# Patient Record
Sex: FEMALE | Race: OTHER | URBAN - METROPOLITAN AREA
[De-identification: names, ages, dates, MRNs, and addresses within clinical notes are randomized per-mention and may not be internally consistent; named-entity substitution may affect disease eponyms.]

---

## 2017-09-28 ENCOUNTER — INPATIENT (INPATIENT)
Facility: HOSPITAL | Age: 67
LOS: 0 days | Discharge: HOME | DRG: 203 | End: 2017-09-29
Attending: STUDENT IN AN ORGANIZED HEALTH CARE EDUCATION/TRAINING PROGRAM | Admitting: STUDENT IN AN ORGANIZED HEALTH CARE EDUCATION/TRAINING PROGRAM
Payer: MEDICARE

## 2017-09-28 VITALS
SYSTOLIC BLOOD PRESSURE: 131 MMHG | TEMPERATURE: 99 F | WEIGHT: 138.01 LBS | DIASTOLIC BLOOD PRESSURE: 84 MMHG | HEART RATE: 85 BPM | OXYGEN SATURATION: 90 % | RESPIRATION RATE: 26 BRPM

## 2017-09-28 DIAGNOSIS — Z98.890 OTHER SPECIFIED POSTPROCEDURAL STATES: Chronic | ICD-10-CM

## 2017-09-28 LAB
ALBUMIN SERPL ELPH-MCNC: 3.9 G/DL — SIGNIFICANT CHANGE UP (ref 3.4–5)
ALP SERPL-CCNC: 109 U/L — SIGNIFICANT CHANGE UP (ref 40–120)
ALT FLD-CCNC: 26 U/L — SIGNIFICANT CHANGE UP (ref 12–42)
ANION GAP SERPL CALC-SCNC: 10 MMOL/L — SIGNIFICANT CHANGE UP (ref 9–16)
AST SERPL-CCNC: 22 U/L — SIGNIFICANT CHANGE UP (ref 15–37)
BILIRUB SERPL-MCNC: 1 MG/DL — SIGNIFICANT CHANGE UP (ref 0.2–1.2)
BUN SERPL-MCNC: 18 MG/DL — SIGNIFICANT CHANGE UP (ref 7–23)
CALCIUM SERPL-MCNC: 9 MG/DL — SIGNIFICANT CHANGE UP (ref 8.5–10.5)
CHLORIDE SERPL-SCNC: 102 MMOL/L — SIGNIFICANT CHANGE UP (ref 96–108)
CK MB BLD-MCNC: 0.78 % — SIGNIFICANT CHANGE UP
CK MB CFR SERPL CALC: 2.3 NG/ML — SIGNIFICANT CHANGE UP (ref 0.5–3.6)
CO2 SERPL-SCNC: 24 MMOL/L — SIGNIFICANT CHANGE UP (ref 22–31)
CREAT SERPL-MCNC: 0.94 MG/DL — SIGNIFICANT CHANGE UP (ref 0.5–1.3)
D DIMER BLD IA.RAPID-MCNC: <150 NG/ML DDU — SIGNIFICANT CHANGE UP
GLUCOSE SERPL-MCNC: 145 MG/DL — HIGH (ref 70–99)
HCT VFR BLD CALC: 40.6 % — SIGNIFICANT CHANGE UP (ref 34.5–45)
HGB BLD-MCNC: 13.6 G/DL — SIGNIFICANT CHANGE UP (ref 11.5–15.5)
LACTATE SERPL-SCNC: 1.5 MMOL/L — SIGNIFICANT CHANGE UP (ref 0.4–2)
MCHC RBC-ENTMCNC: 27 PG — SIGNIFICANT CHANGE UP (ref 27–34)
MCHC RBC-ENTMCNC: 33.5 G/DL — SIGNIFICANT CHANGE UP (ref 32–36)
MCV RBC AUTO: 80.7 FL — SIGNIFICANT CHANGE UP (ref 80–100)
NT-PROBNP SERPL-SCNC: 185 PG/ML — SIGNIFICANT CHANGE UP
PCO2 BLDA: 36 MMHG — SIGNIFICANT CHANGE UP (ref 32–45)
PH BLDA: 7.42 — SIGNIFICANT CHANGE UP (ref 7.35–7.45)
PLATELET # BLD AUTO: 257 K/UL — SIGNIFICANT CHANGE UP (ref 150–400)
PO2 BLDA: 60 MMHG — LOW (ref 83–108)
POTASSIUM SERPL-MCNC: 3.4 MMOL/L — LOW (ref 3.5–5.3)
POTASSIUM SERPL-SCNC: 3.4 MMOL/L — LOW (ref 3.5–5.3)
PROT SERPL-MCNC: 8.7 G/DL — HIGH (ref 6.4–8.2)
RBC # BLD: 5.03 M/UL — SIGNIFICANT CHANGE UP (ref 3.8–5.2)
RBC # FLD: 14.1 % — SIGNIFICANT CHANGE UP (ref 10.3–16.9)
SAO2 % BLDA: 92 % — LOW (ref 95–100)
SODIUM SERPL-SCNC: 136 MMOL/L — SIGNIFICANT CHANGE UP (ref 132–145)
TROPONIN I SERPL-MCNC: 0.02 NG/ML — SIGNIFICANT CHANGE UP (ref 0.02–0.06)
TROPONIN I SERPL-MCNC: 0.03 NG/ML — SIGNIFICANT CHANGE UP (ref 0.02–0.06)
WBC # BLD: 9.2 K/UL — SIGNIFICANT CHANGE UP (ref 3.8–10.5)
WBC # FLD AUTO: 9.2 K/UL — SIGNIFICANT CHANGE UP (ref 3.8–10.5)

## 2017-09-28 PROCEDURE — 93010 ELECTROCARDIOGRAM REPORT: CPT

## 2017-09-28 PROCEDURE — 71275 CT ANGIOGRAPHY CHEST: CPT | Mod: 26

## 2017-09-28 PROCEDURE — 99285 EMERGENCY DEPT VISIT HI MDM: CPT | Mod: 25

## 2017-09-28 PROCEDURE — 71010: CPT | Mod: 26

## 2017-09-28 RX ORDER — IPRATROPIUM/ALBUTEROL SULFATE 18-103MCG
3 AEROSOL WITH ADAPTER (GRAM) INHALATION ONCE
Qty: 0 | Refills: 0 | Status: COMPLETED | OUTPATIENT
Start: 2017-09-28 | End: 2017-09-28

## 2017-09-28 RX ORDER — DIPHENHYDRAMINE HCL 50 MG
50 CAPSULE ORAL ONCE
Qty: 0 | Refills: 0 | Status: COMPLETED | OUTPATIENT
Start: 2017-09-28 | End: 2017-09-28

## 2017-09-28 RX ORDER — ALBUTEROL 90 UG/1
2.5 AEROSOL, METERED ORAL ONCE
Qty: 0 | Refills: 0 | Status: COMPLETED | OUTPATIENT
Start: 2017-09-28 | End: 2017-09-28

## 2017-09-28 RX ORDER — FUROSEMIDE 40 MG
40 TABLET ORAL ONCE
Qty: 0 | Refills: 0 | Status: COMPLETED | OUTPATIENT
Start: 2017-09-28 | End: 2017-09-28

## 2017-09-28 RX ORDER — HEPARIN SODIUM 5000 [USP'U]/ML
5000 INJECTION INTRAVENOUS; SUBCUTANEOUS EVERY 8 HOURS
Qty: 0 | Refills: 0 | Status: DISCONTINUED | OUTPATIENT
Start: 2017-09-28 | End: 2017-09-29

## 2017-09-28 RX ORDER — POTASSIUM CHLORIDE 20 MEQ
40 PACKET (EA) ORAL ONCE
Qty: 0 | Refills: 0 | Status: COMPLETED | OUTPATIENT
Start: 2017-09-28 | End: 2017-09-29

## 2017-09-28 RX ORDER — ASPIRIN/CALCIUM CARB/MAGNESIUM 324 MG
325 TABLET ORAL ONCE
Qty: 0 | Refills: 0 | Status: COMPLETED | OUTPATIENT
Start: 2017-09-28 | End: 2017-09-28

## 2017-09-28 RX ORDER — IPRATROPIUM/ALBUTEROL SULFATE 18-103MCG
3 AEROSOL WITH ADAPTER (GRAM) INHALATION EVERY 4 HOURS
Qty: 0 | Refills: 0 | Status: DISCONTINUED | OUTPATIENT
Start: 2017-09-28 | End: 2017-09-29

## 2017-09-28 RX ORDER — MAGNESIUM SULFATE 500 MG/ML
2 VIAL (ML) INJECTION ONCE
Qty: 0 | Refills: 0 | Status: COMPLETED | OUTPATIENT
Start: 2017-09-28 | End: 2017-09-28

## 2017-09-28 RX ADMIN — Medication 50 MILLIGRAM(S): at 18:24

## 2017-09-28 RX ADMIN — Medication 325 MILLIGRAM(S): at 18:03

## 2017-09-28 RX ADMIN — Medication 50 GRAM(S): at 20:01

## 2017-09-28 RX ADMIN — Medication 40 MILLIGRAM(S): at 18:03

## 2017-09-28 RX ADMIN — ALBUTEROL 2.5 MILLIGRAM(S): 90 AEROSOL, METERED ORAL at 20:01

## 2017-09-28 RX ADMIN — Medication 125 MILLIGRAM(S): at 16:46

## 2017-09-28 RX ADMIN — Medication 3 MILLILITER(S): at 16:46

## 2017-09-28 RX ADMIN — ALBUTEROL 2.5 MILLIGRAM(S): 90 AEROSOL, METERED ORAL at 21:20

## 2017-09-28 RX ADMIN — Medication 3 MILLILITER(S): at 18:03

## 2017-09-28 RX ADMIN — Medication 3 MILLILITER(S): at 16:39

## 2017-09-28 NOTE — ED PROVIDER NOTE - DIAGNOSTIC INTERPRETATION
Xray (wet reads) interpreted by SADI PARRISH   CXR - Cardiac silhouette, mediastinal and hilar contours wnl, no acute consolidation, infiltrate, effusion, or PTX. No bony abnormalities noted

## 2017-09-28 NOTE — CONSULT NOTE ADULT - SUBJECTIVE AND OBJECTIVE BOX
ICU CONSULT        PMHx -   PSx -   Meds -   Allergies -   FHx -   Sx -       PHYSICAL EXAM   Vital Signs Last 24 Hrs  T(C): 36.7 (28 Sep 2017 23:13), Max: 37.1 (28 Sep 2017 16:15)  T(F): 98 (28 Sep 2017 23:13), Max: 98.7 (28 Sep 2017 16:15)  HR: 82 (28 Sep 2017 23:13) (82 - 93)  BP: 131/71 (28 Sep 2017 23:13) (106/57 - 131/84)  BP(mean): --  RR: 20 (28 Sep 2017 23:13) (20 - 80)  SpO2: 99% (28 Sep 2017 23:13) (90% - 99%)      General -   HEENT -   CV -   Resp -   Abdomen -   Extremities -   Skin -       LABS                        13.6   9.2   )-----------( 257      ( 28 Sep 2017 16:59 )             40.6     09-28    136  |  102  |  18  ----------------------------<  145<H>  3.4<L>   |  24  |  0.94    Ca    9.0      28 Sep 2017 16:59    TPro  8.7<H>  /  Alb  3.9  /  TBili  1.0  /  DBili  x   /  AST  22  /  ALT  26  /  AlkPhos  109  09-28      Lactate, Blood: 1.5 mmoL/L (09-28-17 @ 16:59)            IMAGING   CXR -   EKG - ICU CONSULT    Ms. Nehemiah Lazo is a 66 y/o F nonsmoker who presented to [facility] ED with subacute onset of SOB, preceded by one day of rhinorrhea and productive cough. Upon arrival to the ED, she was saturating 90% on room air. She underwent a CTA of the chest to r/o PE despite a D-dimer level <150. The CT did show b/l lower lobe bronchial wall thickening with mucoid impacted left lower lobe airways with associated atelectasis. She was given nebulizer treatments, solumedrol, Lasix. She was placed on 2L NC and was saturating 99%.      PMHx -   PSx -   Meds -   Allergies -   FHx -   Sx -       PHYSICAL EXAM   Vital Signs Last 24 Hrs  T(C): 36.7 (28 Sep 2017 23:13), Max: 37.1 (28 Sep 2017 16:15)  T(F): 98 (28 Sep 2017 23:13), Max: 98.7 (28 Sep 2017 16:15)  HR: 82 (28 Sep 2017 23:13) (82 - 93)  BP: 131/71 (28 Sep 2017 23:13) (106/57 - 131/84)  BP(mean): --  RR: 20 (28 Sep 2017 23:13) (20 - 80)  SpO2: 99% (28 Sep 2017 23:13) (90% - 99%)      General -   HEENT -   CV -   Resp -   Abdomen -   Extremities -   Skin -       LABS                        13.6   9.2   )-----------( 257      ( 28 Sep 2017 16:59 )             40.6     09-28    136  |  102  |  18  ----------------------------<  145<H>  3.4<L>   |  24  |  0.94    Ca    9.0      28 Sep 2017 16:59    TPro  8.7<H>  /  Alb  3.9  /  TBili  1.0  /  DBili  x   /  AST  22  /  ALT  26  /  AlkPhos  109  09-28      Lactate, Blood: 1.5 mmoL/L (09-28-17 @ 16:59)            IMAGING   CXR -   EKG - ICU CONSULT    Ms. Nehemiah Lazo is a 66 y/o F nonsmoker who presented to [facility] ED with subacute onset of SOB, preceded by one day of rhinorrhea and productive cough. Upon arrival to the ED, she was saturating 90% on room air. She underwent a CTA of the chest to r/o PE despite a D-dimer level <150. The CT did show b/l lower lobe bronchial wall thickening with mucoid impacted left lower lobe airways with associated atelectasis. She was given nebulizer treatments, solumedrol, Lasix. She was placed on 2L NC and was saturating 99%.      PMHx -  HLD, breast cancer  PSx - lumpectomy (2014)  Meds - anastrazole, atorvastatin, vitamin D3  Allergies - none known  FHx - noncontributory  Sx - denies smoking history      PHYSICAL EXAM   Vital Signs Last 24 Hrs  T(C): 36.7 (28 Sep 2017 23:13), Max: 37.1 (28 Sep 2017 16:15)  T(F): 98 (28 Sep 2017 23:13), Max: 98.7 (28 Sep 2017 16:15)  HR: 82 (28 Sep 2017 23:13) (82 - 93)  BP: 131/71 (28 Sep 2017 23:13) (106/57 - 131/84)  BP(mean): --  RR: 20 (28 Sep 2017 23:13) (20 - 80)  SpO2: 99% (28 Sep 2017 23:13) (90% - 99%)      General -   HEENT -   CV -   Resp -   Abdomen -   Extremities -   Skin -       LABS                        13.6   9.2   )-----------( 257      ( 28 Sep 2017 16:59 )             40.6     09-28    136  |  102  |  18  ----------------------------<  145<H>  3.4<L>   |  24  |  0.94    Ca    9.0      28 Sep 2017 16:59    TPro  8.7<H>  /  Alb  3.9  /  TBili  1.0  /  DBili  x   /  AST  22  /  ALT  26  /  AlkPhos  109  09-28      Lactate, Blood: 1.5 mmoL/L (09-28-17 @ 16:59)            IMAGING   CXR -   EKG - ICU CONSULT    Ms. Nehemiah Lazo is a 66 y/o F nonsmoker who presented to [facility] ED with subacute onset of SOB, preceded by one day of rhinorrhea and productive cough. Upon arrival to the ED, she was saturating 90% on room air. She underwent a CTA of the chest to r/o PE despite a D-dimer level <150. The CT did show b/l lower lobe bronchial wall thickening with mucoid impacted left lower lobe airways with associated atelectasis. She was given nebulizer treatments, solumedrol, Lasix. She was placed on 2L NC and was saturating 99%.      PMHx -  HLD, breast cancer  PSx - lumpectomy (2014)  Meds - anastrazole, atorvastatin, vitamin D3  Allergies - none known  FHx - noncontributory  Sx - denies smoking history      PHYSICAL EXAM   Vital Signs Last 24 Hrs  T(C): 36.7 (28 Sep 2017 23:13), Max: 37.1 (28 Sep 2017 16:15)  T(F): 98 (28 Sep 2017 23:13), Max: 98.7 (28 Sep 2017 16:15)  HR: 82 (28 Sep 2017 23:13) (82 - 93)  BP: 131/71 (28 Sep 2017 23:13) (106/57 - 131/84)  BP(mean): --  RR: 20 (28 Sep 2017 23:13) (20 - 80)  SpO2: 99% (28 Sep 2017 23:13) (90% - 99%)      General - NAD, comfortable on 2L NC  HEENT - NC/AT, PERRLA, MMM  CV - S1/S2, RRR, no murmur  Resp - fine inspiratory/expiratory crackles diffusely, no wheezing or stridor   Abdomen - +BS, soft, NTND, no masses  Extremities - WWP, no edema  Skin - no rash      LABS                        13.6   9.2   )-----------( 257      ( 28 Sep 2017 16:59 )             40.6     09-28    136  |  102  |  18  ----------------------------<  145<H>  3.4<L>   |  24  |  0.94    Ca    9.0      28 Sep 2017 16:59    TPro  8.7<H>  /  Alb  3.9  /  TBili  1.0  /  DBili  x   /  AST  22  /  ALT  26  /  AlkPhos  109  09-28      Lactate, Blood: 1.5 mmoL/L (09-28-17 @ 16:59)            IMAGING   CXR -   EKG - ICU CONSULT    Ms. Nehemiah Lazo is a 68 y/o F nonsmoker who presented to [facility] ED with subacute onset of SOB, preceded by one day of rhinorrhea and cough productive of yellow sputum. Upon arrival to the ED, she was saturating 90% on room air. She underwent a CTA of the chest to r/o PE despite a D-dimer level <150. The CT did show b/l lower lobe bronchial wall thickening with mucoid impacted left lower lobe airways with associated atelectasis. She was given nebulizer treatments, solumedrol, Lasix. She was placed on 2L NC and was saturating 99%.      PMHx -  HLD, breast cancer  PSx - lumpectomy (2014)  Meds - anastrazole, atorvastatin, vitamin D3  Allergies - none known  FHx - noncontributory  Sx - denies smoking history      PHYSICAL EXAM   Vital Signs Last 24 Hrs  T(C): 36.7 (28 Sep 2017 23:13), Max: 37.1 (28 Sep 2017 16:15)  T(F): 98 (28 Sep 2017 23:13), Max: 98.7 (28 Sep 2017 16:15)  HR: 82 (28 Sep 2017 23:13) (82 - 93)  BP: 131/71 (28 Sep 2017 23:13) (106/57 - 131/84)  BP(mean): --  RR: 20 (28 Sep 2017 23:13) (20 - 80)  SpO2: 99% (28 Sep 2017 23:13) (90% - 99%)      General - NAD, comfortable on 2L NC  HEENT - NC/AT, PERRLA, MMM  CV - S1/S2, RRR, no murmur  Resp - fine inspiratory/expiratory crackles diffusely, no wheezing or stridor   Abdomen - +BS, soft, NTND, no masses  Extremities - WWP, no edema  Skin - no rash      LABS                        13.6   9.2   )-----------( 257      ( 28 Sep 2017 16:59 )             40.6     09-28    136  |  102  |  18  ----------------------------<  145<H>  3.4<L>   |  24  |  0.94    Ca    9.0      28 Sep 2017 16:59    TPro  8.7<H>  /  Alb  3.9  /  TBili  1.0  /  DBili  x   /  AST  22  /  ALT  26  /  AlkPhos  109  09-28      Lactate, Blood: 1.5 mmoL/L (09-28-17 @ 16:59)            IMAGING   CXR -   EKG -

## 2017-09-28 NOTE — ED PROVIDER NOTE - ATTENDING CONTRIBUTION TO CARE
67 y.o. female with HTN hx breast CA now in remission with c/o 2 days increasing difficulty breathing and fatigue, cough, on arrival with moderate resp distress and wheezing, some improvement with nebs and steroids, CAT negative for PE, VSS but still hypoxic on RA and requiring supplement o2 as well as continued nebs, admit to [facility] medicine for further workup of RAD/bronchitis, persistent hypoxia despite treatment in ED.

## 2017-09-28 NOTE — ED PROVIDER NOTE - CARE PLAN
Principal Discharge DX:	Acute respiratory distress  Secondary Diagnosis:	Hypoxia  Secondary Diagnosis:	Wheezing

## 2017-09-28 NOTE — CONSULT NOTE ADULT - ASSESSMENT
66 y/o F presented with cough and SOB. Found to have mild hypoxia, which improved with nasal cannula. Symptoms attributable to acute bronchitis. No indication for admission to MICU or MICU-step down at this time.

## 2017-09-28 NOTE — CONSULT NOTE ADULT - ATTENDING COMMENTS
[facility] ATTENDING    Patient seen and discussed with House Officer/Resident  Chart and history reviewed  Exam, labs, and radiology as noted above   Assessment and Plans as outlined  Acute bronchospasm and mild pvc noted on cxr responding to combination steroids diuresis and bronchodilators  Currently patient's mentation is appropriate   Protecting airway   Breathing is non-labored without increased work of breathing --- no intercostal accessory muscle use and no paradoxical abdominal motion evident   Ventilating and oxygenating adequately without increased work of breathing  Hemodynamically stable---clinically perfusing adequately  Aim to maintain MAP >= 65 mmHg, U/O >= 0.5 ml/kg/hr, pulse oximetry OxSat >= 92%   Metabolic demands along with any abnormal blood chemistries, and fluid requirements being addressed    Sedation and/or pain meds as needed to reduce metabolic demand and maintain comfort   GI/DVT prophylaxis

## 2017-09-28 NOTE — ED PROVIDER NOTE - MEDICAL DECISION MAKING DETAILS
pt with acute onset of SOB at rest today, noted 2d of productive cough, afebrile otherwise, tachypneic and hypoxic to 88% on RA on arrival, EKG with slight non specific T wave changes, pt is cp free, labs unremarkable, ABG with hypoxia, trop neg x 2, repeat EKG with no dynamic changes, CTA neg for acute PE/infectious process, s/p multiple neb tx, solumedrol, Mg, and lasix with slight improvement, still hypoxic at 94% on 2L, case discussed with Dr. Yu, stable for admission to [facility] medicine for acute resp distress/hypoxia/wheezing

## 2017-09-28 NOTE — CONSULT NOTE ADULT - PROBLEM SELECTOR RECOMMENDATION 9
Likely acute viral bronchitis as evidenced by her history and CT scan evidence of bronchial wall thickening, with associated atelectasis due to mucoid impacted airways. She likely has some component of mild radiation-induced lung disease, as evidenced by fine crackles on auscultation and CT evidence of biapical scarring. However, doubt this is contributing to her current presentation since at baseline she is asymptomatic with excellent exercise tolerance.  RVP already sent, would expect it to be positive. No indication for antibiotics. No wheezing, so no indication for albuterol. May give a short 5-day course of prednisone if desired. PRN dextromethorphan syrup for cough if needed. Expect self-resolution of symptoms.

## 2017-09-28 NOTE — ED PROVIDER NOTE - OBJECTIVE STATEMENT
66 yo F with PMHx of breast CA s/p L lumpectomy, RTX, currently in remission x 3 yrs, HTN, presenting c/o acute worsening of SOB today.  Pt reports having cough productive of yellowish sputum in the past 2d with associated GASTELUM.  Noted acutely worsening SOB at rest today.  Denies fever, chills, wheezing, hemoptysis, CP, orthopnea, palpitations, peripheral edema, stridors, focal weakness, sore throat, tinnitus, HA, dizziness, N/V/D/C, abdominal pain, change in urinary/bowel function, night sweats, and malaise. No recent travel or sick contact noted

## 2017-09-28 NOTE — ED PROVIDER NOTE - PHYSICAL EXAMINATION
Gen - WDWN F, speaking in full sentences, mild tachypneic, no accessory muscle use   Skin - warm, dry, intact  HEENT - AT/NC, PERRL, EOMI, no conjunctival injection, o/p clear with no erythema, edema, or exudate, uvula midline, airway patent, neck supple, no JVD or carotid bruits b/l, no palpable nodes   CV - S1S2, R/R/R  Resp - mildly tachypneic, b/l inspiratory and expiratory wheezing with diminished bs, no r/r   GI - NABS, soft, ND, NT, no rebound or guarding, no CVAT b/l   MS - w/w/p, no c/c/e, calves supple and NT, distal pulses symmetric b/l   Neuro - AxOx3, no focal neuro deficits, CN II-XII grossly intact, cerebellar function intact, ambulatory without gait disturbance

## 2017-09-28 NOTE — ED PROVIDER NOTE - ST/T WAVE
no acute ST-T wave changes no acute ST-T wave changes, poor baseline slight STD in V4-6, no LY or T wave changes slight non specific ST-T wave changes in V4-5

## 2017-09-29 VITALS
SYSTOLIC BLOOD PRESSURE: 127 MMHG | HEART RATE: 64 BPM | DIASTOLIC BLOOD PRESSURE: 71 MMHG | TEMPERATURE: 98 F | RESPIRATION RATE: 19 BRPM | OXYGEN SATURATION: 98 %

## 2017-09-29 DIAGNOSIS — J20.9 ACUTE BRONCHITIS, UNSPECIFIED: ICD-10-CM

## 2017-09-29 DIAGNOSIS — E78.5 HYPERLIPIDEMIA, UNSPECIFIED: ICD-10-CM

## 2017-09-29 DIAGNOSIS — R73.9 HYPERGLYCEMIA, UNSPECIFIED: ICD-10-CM

## 2017-09-29 DIAGNOSIS — J20.6 ACUTE BRONCHITIS DUE TO RHINOVIRUS: ICD-10-CM

## 2017-09-29 DIAGNOSIS — R06.02 SHORTNESS OF BREATH: ICD-10-CM

## 2017-09-29 DIAGNOSIS — R63.8 OTHER SYMPTOMS AND SIGNS CONCERNING FOOD AND FLUID INTAKE: ICD-10-CM

## 2017-09-29 DIAGNOSIS — Z29.9 ENCOUNTER FOR PROPHYLACTIC MEASURES, UNSPECIFIED: ICD-10-CM

## 2017-09-29 DIAGNOSIS — R91.1 SOLITARY PULMONARY NODULE: ICD-10-CM

## 2017-09-29 DIAGNOSIS — R09.02 HYPOXEMIA: ICD-10-CM

## 2017-09-29 DIAGNOSIS — E87.6 HYPOKALEMIA: ICD-10-CM

## 2017-09-29 DIAGNOSIS — D72.828 OTHER ELEVATED WHITE BLOOD CELL COUNT: ICD-10-CM

## 2017-09-29 DIAGNOSIS — Z85.3 PERSONAL HISTORY OF MALIGNANT NEOPLASM OF BREAST: ICD-10-CM

## 2017-09-29 LAB
ANION GAP SERPL CALC-SCNC: 19 MMOL/L — HIGH (ref 5–17)
BUN SERPL-MCNC: 22 MG/DL — SIGNIFICANT CHANGE UP (ref 7–23)
CALCIUM SERPL-MCNC: 9 MG/DL — SIGNIFICANT CHANGE UP (ref 8.4–10.5)
CHLORIDE SERPL-SCNC: 98 MMOL/L — SIGNIFICANT CHANGE UP (ref 96–108)
CO2 SERPL-SCNC: 20 MMOL/L — LOW (ref 22–31)
CREAT SERPL-MCNC: 0.85 MG/DL — SIGNIFICANT CHANGE UP (ref 0.5–1.3)
GLUCOSE SERPL-MCNC: 357 MG/DL — HIGH (ref 70–99)
HCT VFR BLD CALC: 39.2 % — SIGNIFICANT CHANGE UP (ref 34.5–45)
HGB BLD-MCNC: 13.7 G/DL — SIGNIFICANT CHANGE UP (ref 11.5–15.5)
MAGNESIUM SERPL-MCNC: 3.1 MG/DL — HIGH (ref 1.6–2.6)
MCHC RBC-ENTMCNC: 27.6 PG — SIGNIFICANT CHANGE UP (ref 27–34)
MCHC RBC-ENTMCNC: 34.9 G/DL — SIGNIFICANT CHANGE UP (ref 32–36)
MCV RBC AUTO: 79 FL — LOW (ref 80–100)
PLATELET # BLD AUTO: 244 K/UL — SIGNIFICANT CHANGE UP (ref 150–400)
POTASSIUM SERPL-MCNC: 3.5 MMOL/L — SIGNIFICANT CHANGE UP (ref 3.5–5.3)
POTASSIUM SERPL-SCNC: 3.5 MMOL/L — SIGNIFICANT CHANGE UP (ref 3.5–5.3)
RAPID RVP RESULT: DETECTED
RBC # BLD: 4.96 M/UL — SIGNIFICANT CHANGE UP (ref 3.8–5.2)
RBC # FLD: 14.5 % — SIGNIFICANT CHANGE UP (ref 10.3–16.9)
RV+EV RNA SPEC QL NAA+PROBE: DETECTED
SODIUM SERPL-SCNC: 137 MMOL/L — SIGNIFICANT CHANGE UP (ref 135–145)
WBC # BLD: 12 K/UL — HIGH (ref 3.8–10.5)
WBC # FLD AUTO: 12 K/UL — HIGH (ref 3.8–10.5)

## 2017-09-29 PROCEDURE — 99223 1ST HOSP IP/OBS HIGH 75: CPT | Mod: GC

## 2017-09-29 PROCEDURE — 93010 ELECTROCARDIOGRAM REPORT: CPT

## 2017-09-29 PROCEDURE — 94010 BREATHING CAPACITY TEST: CPT | Mod: 26

## 2017-09-29 RX ORDER — ANASTROZOLE 1 MG/1
1 TABLET ORAL
Qty: 0 | Refills: 0 | COMMUNITY

## 2017-09-29 RX ORDER — INSULIN LISPRO 100/ML
VIAL (ML) SUBCUTANEOUS
Qty: 0 | Refills: 0 | Status: DISCONTINUED | OUTPATIENT
Start: 2017-09-29 | End: 2017-09-29

## 2017-09-29 RX ORDER — ATORVASTATIN CALCIUM 80 MG/1
40 TABLET, FILM COATED ORAL AT BEDTIME
Qty: 0 | Refills: 0 | Status: DISCONTINUED | OUTPATIENT
Start: 2017-09-29 | End: 2017-09-29

## 2017-09-29 RX ORDER — DEXTROSE 50 % IN WATER 50 %
12.5 SYRINGE (ML) INTRAVENOUS ONCE
Qty: 0 | Refills: 0 | Status: DISCONTINUED | OUTPATIENT
Start: 2017-09-29 | End: 2017-09-29

## 2017-09-29 RX ORDER — CHOLECALCIFEROL (VITAMIN D3) 125 MCG
1 CAPSULE ORAL
Qty: 0 | Refills: 0 | COMMUNITY

## 2017-09-29 RX ORDER — DEXTROSE 50 % IN WATER 50 %
25 SYRINGE (ML) INTRAVENOUS ONCE
Qty: 0 | Refills: 0 | Status: DISCONTINUED | OUTPATIENT
Start: 2017-09-29 | End: 2017-09-29

## 2017-09-29 RX ORDER — SODIUM CHLORIDE 9 MG/ML
1000 INJECTION, SOLUTION INTRAVENOUS
Qty: 0 | Refills: 0 | Status: DISCONTINUED | OUTPATIENT
Start: 2017-09-29 | End: 2017-09-29

## 2017-09-29 RX ORDER — ANASTROZOLE 1 MG/1
1 TABLET ORAL DAILY
Qty: 0 | Refills: 0 | Status: DISCONTINUED | OUTPATIENT
Start: 2017-09-29 | End: 2017-09-29

## 2017-09-29 RX ORDER — POTASSIUM CHLORIDE 20 MEQ
40 PACKET (EA) ORAL ONCE
Qty: 0 | Refills: 0 | Status: COMPLETED | OUTPATIENT
Start: 2017-09-29 | End: 2017-09-29

## 2017-09-29 RX ORDER — GLUCAGON INJECTION, SOLUTION 0.5 MG/.1ML
1 INJECTION, SOLUTION SUBCUTANEOUS ONCE
Qty: 0 | Refills: 0 | Status: DISCONTINUED | OUTPATIENT
Start: 2017-09-29 | End: 2017-09-29

## 2017-09-29 RX ORDER — ATORVASTATIN CALCIUM 80 MG/1
1 TABLET, FILM COATED ORAL
Qty: 0 | Refills: 0 | COMMUNITY

## 2017-09-29 RX ORDER — DEXTROSE 50 % IN WATER 50 %
1 SYRINGE (ML) INTRAVENOUS ONCE
Qty: 0 | Refills: 0 | Status: DISCONTINUED | OUTPATIENT
Start: 2017-09-29 | End: 2017-09-29

## 2017-09-29 RX ORDER — CHOLECALCIFEROL (VITAMIN D3) 125 MCG
1000 CAPSULE ORAL DAILY
Qty: 0 | Refills: 0 | Status: DISCONTINUED | OUTPATIENT
Start: 2017-09-29 | End: 2017-09-29

## 2017-09-29 RX ORDER — PANTOPRAZOLE SODIUM 20 MG/1
40 TABLET, DELAYED RELEASE ORAL
Qty: 0 | Refills: 0 | Status: DISCONTINUED | OUTPATIENT
Start: 2017-09-29 | End: 2017-09-29

## 2017-09-29 RX ADMIN — Medication 40 MILLIEQUIVALENT(S): at 00:10

## 2017-09-29 RX ADMIN — Medication 3 MILLILITER(S): at 11:15

## 2017-09-29 RX ADMIN — Medication 40 MILLIEQUIVALENT(S): at 12:43

## 2017-09-29 RX ADMIN — Medication 6: at 12:42

## 2017-09-29 RX ADMIN — Medication 3 MILLILITER(S): at 06:10

## 2017-09-29 RX ADMIN — Medication 3 MILLILITER(S): at 00:11

## 2017-09-29 RX ADMIN — Medication 60 MILLIGRAM(S): at 06:11

## 2017-09-29 RX ADMIN — HEPARIN SODIUM 5000 UNIT(S): 5000 INJECTION INTRAVENOUS; SUBCUTANEOUS at 06:10

## 2017-09-29 RX ADMIN — Medication 3 MILLILITER(S): at 14:21

## 2017-09-29 RX ADMIN — HEPARIN SODIUM 5000 UNIT(S): 5000 INJECTION INTRAVENOUS; SUBCUTANEOUS at 14:21

## 2017-09-29 RX ADMIN — PANTOPRAZOLE SODIUM 40 MILLIGRAM(S): 20 TABLET, DELAYED RELEASE ORAL at 06:11

## 2017-09-29 RX ADMIN — Medication 8: at 08:57

## 2017-09-29 RX ADMIN — Medication 1000 UNIT(S): at 12:43

## 2017-09-29 NOTE — DISCHARGE NOTE ADULT - MEDICATION SUMMARY - MEDICATIONS TO TAKE
I will START or STAY ON the medications listed below when I get home from the hospital:    Lipitor 40 mg oral tablet  -- 1 tab(s) by mouth once a day  -- Indication: For Hyperlipidemia    anastrozole 1 mg oral tablet  -- 1 tab(s) by mouth once a day  -- Indication: For Breast cancer    Vitamin D3 1000 intl units oral tablet  -- 1 tab(s) by mouth once a day  -- Indication: For Prophylactic measure

## 2017-09-29 NOTE — H&P ADULT - PROBLEM SELECTOR PLAN 3
Pt received 28 d of radiation for breast ca in 2014. Did not receive chemotherapy. 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

## 2017-09-29 NOTE — H&P ADULT - NSHPPHYSICALEXAM_GEN_ALL_CORE
.  VITAL SIGNS:  T(C): 36.7 (09-28-17 @ 23:13), Max: 37.1 (09-28-17 @ 16:15)  T(F): 98 (09-28-17 @ 23:13), Max: 98.7 (09-28-17 @ 16:15)  HR: 82 (09-28-17 @ 23:13) (82 - 93)  BP: 131/71 (09-28-17 @ 23:13) (106/57 - 131/84)  BP(mean): --  RR: 20 (09-28-17 @ 23:13) (20 - 80)  SpO2: 99% (09-28-17 @ 23:13) (90% - 99%)  Wt(kg): --    PHYSICAL EXAM:    Constitutional: WDWN resting comfortably in bed; NAD, nasal cannula in place   Head: NC/AT  Eyes: PERRL, EOMI, clear conjunctiva  ENT: no nasal discharge; uvula midline, no oropharyngeal erythema or exudates; MMM  Neck: supple; no JVD or thyromegaly  Respiratory: speaking in full sentences, no accessory ms use. Fine inspiratory and expiratory wheezes heard on anterior chest bilaterally. Fine expiratory crackles heard at EUNICE and RUL. Poor aeration throughout.   Cardiac: +S1/S2; RRR; 3/6 systolic murmur best heard at LUSB  Gastrointestinal: soft, NT/ND; no rebound or guarding; +BSx4  Back: spine midline, no bony tenderness or step-offs; no CVAT B/L  Extremities: WWP, no clubbing or cyanosis; no peripheral edema  Musculoskeletal: NROM x4; no joint swelling, tenderness or erythema  Vascular: 2+ radial, DP pulses B/L  Dermatologic: skin warm, dry and intact; no rashes, wounds, or scars  Lymphatic: no submandibular or cervical LAD  Neurologic: AAOx3; CNII-XII grossly intact; no focal deficits  Psychiatric: affect and characteristics of appearance, verbalizations, behaviors are appropriate .  VITAL SIGNS:  T(C): 36.7 (09-28-17 @ 23:13), Max: 37.1 (09-28-17 @ 16:15)  T(F): 98 (09-28-17 @ 23:13), Max: 98.7 (09-28-17 @ 16:15)  HR: 82 (09-28-17 @ 23:13) (82 - 93)  BP: 131/71 (09-28-17 @ 23:13) (106/57 - 131/84)  BP(mean): --  RR: 20 (09-28-17 @ 23:13) (20 - 80)  SpO2: 99% (09-28-17 @ 23:13) (90% - 99%)  Wt(kg): --    PHYSICAL EXAM:    Constitutional: WDWN resting comfortably in bed; NAD, nasal cannula in place   Head: NC/AT  Eyes: PERRL, EOMI, clear conjunctiva  ENT: no nasal discharge; uvula midline, no oropharyngeal erythema or exudates; MMM  Neck: supple; no JVD or thyromegaly  Respiratory: speaking in full sentences, no accessory ms use. Fine inspiratory and expiratory wheezes heard on anterior chest bilaterally. Fine expiratory crackles heard at EUNICE and RUL. Poor aeration throughout.   Cardiac: +S1/S2; RRR; 3/6 systolic murmur best heard at LLSB  Gastrointestinal: soft, NT/ND; no rebound or guarding; +BSx4  Back: spine midline, no bony tenderness or step-offs; no CVAT B/L  Extremities: WWP, no clubbing or cyanosis; no peripheral edema  Musculoskeletal: NROM x4; no joint swelling, tenderness or erythema  Vascular: 2+ radial, DP pulses B/L  Dermatologic: skin warm, dry and intact; no rashes, wounds, or scars  Lymphatic: no submandibular or cervical LAD  Neurologic: AAOx3; CNII-XII grossly intact; no focal deficits  Psychiatric: affect and characteristics of appearance, verbalizations, behaviors are appropriate

## 2017-09-29 NOTE — DISCHARGE NOTE ADULT - HOSPITAL COURSE
67F with PMHx of HLD and breast cancer s/p L lumpectomy s/p 28d of radiation in 2014 on anastrazole in remission presenting with SOB, cough productive with yellow sputum, and rhinorrhea. Patient was found to be hypoxic to low 90s and was put on 2L NC. CT PE was performed showing bronchial wall thickening of the bilateral lower lobe airways, with scattered areas of mucoid impacted airways in the left lower lobe, with associated atelectatic change. Patient was found to be positive for rhinovirus. Patient was clinically stable, laboratory and radiology studies were reviewed, and she was deemed appropriate for discharge by the internal medicine team.

## 2017-09-29 NOTE — H&P ADULT - PROBLEM SELECTOR PLAN 4
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

## 2017-09-29 NOTE — H&P ADULT - HISTORY OF PRESENT ILLNESS
66yo F w/ PMHx HLD, breast ca s/p L lumpectomy s/p 28d of radiation in 2014, now on Anastrazole in remission, presents with acute SOB. Pt states that yesterday she started having runny nose, productive cough with yellow sputum and last night her cough became so excessive that she became very SOB. She was unable to sleep last night because of the cough and tried to go to work, but she could not walk to the bus stop without becoming severely SOB. Pt denies sore throat, fever, chills, muscle aches, recent sick contacts, travel hx, diarrhea, abd pain, dysuria. Pt has been a  for 40 years and denies using any new cleaning products recently. She denies a history of asthma or seasonal allergies. Pt is a never smoker. She had a mammogram performed in July 2017 which showed no evidence of cancer.     At [facility] ED, T 98, HR 85-93, -131/50-80, RR 22-26, 90% on RA --> 97% on 2L NC. Labs notable for neg d-dimer, po2 60 on ABG. CT chest PE was neg for PE but was notable for Bronchial wall thickening of the bilateral lower lobe airways, with scattered areas of mucoid impacted airways in the left lower lobe, with associated atelectatic change. Given duonebs x1, albuterol 2.5mg x2, benadryl 50mg IVP x1. asa 325,g x1, solumedrol 125mg x1. lasix 40mg IVP x1, mg 2g x1. On arrival to , VS showed T 98F, HR 82, /71, RR 20, 99% on 2L NC.

## 2017-09-29 NOTE — H&P ADULT - PROBLEM SELECTOR PLAN 1
Pt p/w acute SOB and hypoxia starting last night. Ddx includes radiation induced pulm fibrosis   - f/u RVP; currently on droplet isolation   - s/p 125mg solumedrol x1. C/w 60mg prednisone qday  - c/w medium iss and protonix 40mg qAM for steroid ppx   - c/w duonebs q4h   - c/w 2L NC; titrate down as tolerates Pt p/w acute SOB and hypoxia starting last night. Ddx viral illness vs. bronchitis in setting of possible underlying pulmonary fibrosis   - f/u RVP; currently on droplet isolation   - s/p 125mg solumedrol x1. C/w 60mg prednisone qday  - c/w medium iss and protonix 40mg qAM for steroid ppx   - c/w duonebs q4h   - c/w 2L NC; titrate down as tolerates

## 2017-09-29 NOTE — H&P ADULT - PROBLEM SELECTOR PLAN 2
Pt presented with acute hypoxia with PaO2 of 60 on ABG, now sat'ing 99% on 2L NC s/p duonebs, steroids  - c/w 2LNC, wean off as tolerates

## 2017-09-29 NOTE — DISCHARGE NOTE ADULT - PLAN OF CARE
You have a history of breast cancer and you have been in remission for 3 years. Please continue taking your anastrazole and follow up with Dr. Corrigan. Please continue taking your lipitor 40mg. Resolve symptoms You came to the hospital for cough, runny nose, shortness of breath, and desaturation. You were found to have an upper respiratory virus which explains your symptoms. Please continue drinking fluids and get rest. If you have worsening shortness of breath, cough, runny nose, fever, or chills, please return to the emergency room. Please follow up with your Dr. Back 10/9/2017 11AM 17 [address] and [place] on 7th floor, [phone].

## 2017-09-29 NOTE — DISCHARGE NOTE ADULT - PATIENT PORTAL LINK FT
“You can access the [employer] Patient Portal, offered by [facility], by registering with the following website: [email or web address]”

## 2017-09-29 NOTE — H&P ADULT - ATTENDING COMMENTS
Pt seen and examined at bedside on 9/29 @ 1 am    Agree with HPI, ROS as above.     VS, Labs, FH, SH, allergies, medications, imaging reviewed. I personally reviewed the CT chest - no evidence of consolidation, no large embolus. Agree with physical exam as above.    A/P: 68yo F w/ PMHx HLD, breast ca s/p L lumpectomy s/p 28d of radiation in 2014, now on Anastrazole in remission, presents with acute SOB and hypoxia most likely 2/2 underlying pulmonary fibrosis.     **SOB  -Hypoxemia on ABG  -Likely viral URI in the setting of possible chronic underlying lung disease  -Pt previously had lung radiation to L breast - although CT chest does not show specific anatomic field being affected  -Check RVP, spirometry  -Wean O2 as tolerated, consider pulm consult in AM  -Patient's care was discussed with provider at [facility] as well as critical care team  -No obvious signs of pneumonia - no indication for antibiotics at this time, pt afebrile with no leukocytosis    Plan otherwise as outlined above....

## 2017-09-29 NOTE — PROGRESS NOTE ADULT - SUBJECTIVE AND OBJECTIVE BOX
Patient is a 67y old  Female who presents with a chief complaint of SOB (29 Sep 2017 14:21)      INTERVAL HPI/OVERNIGHT EVENTS:    Pt. seen and examined at 11:30AM  Pt. feels better; c/o less cough and rhinorrhea  No SOB, CP, F/C  Tolerating P.O.    Review of Systems: 12 point review of systems otherwise negative    MEDICATIONS  (STANDING):  heparin  Injectable 5000 Unit(s) SubCutaneous every 8 hours  ALBUTerol/ipratropium for Nebulization 3 milliLiter(s) Nebulizer every 4 hours  predniSONE   Tablet 60 milliGRAM(s) Oral daily  atorvastatin 40 milliGRAM(s) Oral at bedtime  anastrozole 1 milliGRAM(s) Oral daily  cholecalciferol 1000 Unit(s) Oral daily  insulin lispro (HumaLOG) corrective regimen sliding scale   SubCutaneous Before meals and at bedtime  dextrose 5%. 1000 milliLiter(s) (50 mL/Hr) IV Continuous <Continuous>  dextrose 50% Injectable 12.5 Gram(s) IV Push once  dextrose 50% Injectable 25 Gram(s) IV Push once  dextrose 50% Injectable 25 Gram(s) IV Push once  pantoprazole    Tablet 40 milliGRAM(s) Oral before breakfast    MEDICATIONS  (PRN):  dextrose Gel 1 Dose(s) Oral once PRN Blood Glucose LESS THAN 70 milliGRAM(s)/deciliter  glucagon  Injectable 1 milliGRAM(s) IntraMuscular once PRN Glucose LESS THAN 70 milligrams/deciliter      Allergies    No Known Allergies    Intolerances          Vital Signs Last 24 Hrs  T(C): 36.6 (29 Sep 2017 05:59), Max: 37.1 (28 Sep 2017 16:15)  T(F): 97.8 (29 Sep 2017 05:59), Max: 98.7 (28 Sep 2017 16:15)  HR: 64 (29 Sep 2017 05:59) (64 - 93)  BP: 127/71 (29 Sep 2017 05:59) (106/57 - 131/84)  BP(mean): --  RR: 19 (29 Sep 2017 05:59) (19 - 80)  SpO2: 98% (29 Sep 2017 05:59) (90% - 99%)  CAPILLARY BLOOD GLUCOSE  293 (29 Sep 2017 11:36)  338 (29 Sep 2017 07:25)          09-29 @ 07:01  -  09-29 @ 15:32  --------------------------------------------------------  IN: 420 mL / OUT: 0 mL / NET: 420 mL        Physical Exam:  (at 11:30AM)  Daily Height in cm: 149.86 (28 Sep 2017 23:13)    Daily   General:  well-appearing in NAD  HEENT:  MMM  CV:  RRR, no JVD  Lungs:  CTA B/L, no wheezes, normal WOB on NC  Abdomen:  soft NT ND  Extremities:  no edema B/L LE  Skin:  WWP  Neuro:  AAOx3    LABS:                        13.7   12.0  )-----------( 244      ( 29 Sep 2017 08:54 )             39.2     09-29    137  |  98  |  22  ----------------------------<  357<H>  3.5   |  20<L>  |  0.85    Ca    9.0      29 Sep 2017 08:54  Mg     3.1     09-29    TPro  8.7<H>  /  Alb  3.9  /  TBili  1.0  /  DBili  x   /  AST  22  /  ALT  26  /  AlkPhos  109  09-28            RADIOLOGY & ADDITIONAL TESTS:    ---------------------------------------------------------------------------  I personally reviewed: [  ]EKG   [  ]CXR    [  ] CT    [  ]Other  ---------------------------------------------------------------------------  PLEASE CHECK WHEN PRESENT:     [  ]Heart Failure     [  ] Acute     [  ] Acute on Chronic     [  ] Chronic  -------------------------------------------------------------------     [  ]Diastolic [HFpEF]     [  ]Systolic [HFrEF]     [  ]Combined [HFpEF & HFrEF]     [  ]Other:  -------------------------------------------------------------------  [  ]FRANCO     [  ]ATN     [  ]Reneal Medullary Necrosis     [  ]Renal Cortical Necrosis     [  ]Other Pathological Lesions:    [  ]CKD 1  [  ]CKD 2  [  ]CKD 3  [  ]CKD 4  [  ]CKD 5  [  ]Other  -------------------------------------------------------------------  [  ]Other/Unspecified:    --------------------------------------------------------------------    Abdominal Nutritional Status  [  ]Malnutrition: See Nutrition Note  [  ]Cachexia  [  ]Other:   [  ]Supplement Ordered:  [  ]Morbid Obesity (BMI >=40]

## 2017-09-29 NOTE — H&P ADULT - PROBLEM SELECTOR PLAN 7
Pt dx'ed in 2014 with L breast ca s/p L lumpectomy and 28 d of radiation, now on Anastrazole and in remission  - follows with Dr. Corrigan from [facility]  - c/w home anastrazole 1mg qday

## 2017-09-29 NOTE — H&P ADULT - NSHPLABSRESULTS_GEN_ALL_CORE
.  LABS:                         13.6   9.2   )-----------( 257      ( 28 Sep 2017 16:59 )             40.6     09-28    136  |  102  |  18  ----------------------------<  145<H>  3.4<L>   |  24  |  0.94    Ca    9.0      28 Sep 2017 16:59    TPro  8.7<H>  /  Alb  3.9  /  TBili  1.0  /  DBili  x   /  AST  22  /  ALT  26  /  AlkPhos  109  09-28        CARDIAC MARKERS ( 28 Sep 2017 19:32 )  0.029 ng/mL / x     / x     / x     / x      CARDIAC MARKERS ( 28 Sep 2017 16:59 )  0.020 ng/mL / x     / 293 U/L / x     / 2.3 ng/mL      Serum Pro-Brain Natriuretic Peptide: 185 pg/mL (09-28 @ 16:59)    Lactate, Blood: 1.5 mmoL/L (09-28 @ 16:59)      RADIOLOGY, EKG & ADDITIONAL TESTS: Reviewed.     CT chest w/ IV con    1.  No pulmonary embolism is seen.    2.  Enlarged heart with mild aneurysmal dilatation of the ascending aorta   (measuring up to 3.7 cm). No evidence of aortic dissection.    3.  Bronchial wall thickening of the bilateral lower lobe airways, with   scattered areas of mucoid impacted airways in the left lower lobe, with   associated atelectatic change. Findings may be related to reactive   process or infectious/inflammatory process (i.e. bronchitis)    4.  No focal airspace is elevation, pleural effusion or pneumothorax.    5.  A 3 mm nodule in the right middle lobe. Based on the 2017 Fleischner   Society criteria, if the patient has a history of smoking or is otherwise   at high risk for lung cancer, follow-up chest CT in 12 months may be   considered to ensure stability. If the patient is at low risk for lung   cancer, follow-up is not necessary. .  LABS:                         13.6   9.2   )-----------( 257      ( 28 Sep 2017 16:59 )             40.6     09-28    136  |  102  |  18  ----------------------------<  145<H>  3.4<L>   |  24  |  0.94    Ca    9.0      28 Sep 2017 16:59    TPro  8.7<H>  /  Alb  3.9  /  TBili  1.0  /  DBili  x   /  AST  22  /  ALT  26  /  AlkPhos  109  09-28        CARDIAC MARKERS ( 28 Sep 2017 19:32 )  0.029 ng/mL / x     / x     / x     / x      CARDIAC MARKERS ( 28 Sep 2017 16:59 )  0.020 ng/mL / x     / 293 U/L / x     / 2.3 ng/mL      Serum Pro-Brain Natriuretic Peptide: 185 pg/mL (09-28 @ 16:59)    Lactate, Blood: 1.5 mmoL/L (09-28 @ 16:59)    EKG: NSR       RADIOLOGY, EKG & ADDITIONAL TESTS: Reviewed.     CT chest w/ IV con    1.  No pulmonary embolism is seen.    2.  Enlarged heart with mild aneurysmal dilatation of the ascending aorta   (measuring up to 3.7 cm). No evidence of aortic dissection.    3.  Bronchial wall thickening of the bilateral lower lobe airways, with   scattered areas of mucoid impacted airways in the left lower lobe, with   associated atelectatic change. Findings may be related to reactive   process or infectious/inflammatory process (i.e. bronchitis)    4.  No focal airspace is elevation, pleural effusion or pneumothorax.    5.  A 3 mm nodule in the right middle lobe. Based on the 2017 Fleischner   Society criteria, if the patient has a history of smoking or is otherwise   at high risk for lung cancer, follow-up chest CT in 12 months may be   considered to ensure stability. If the patient is at low risk for lung   cancer, follow-up is not necessary.

## 2017-09-29 NOTE — H&P ADULT - ASSESSMENT
68yo F w/ PMHx HLD, breast ca s/p L lumpectomy s/p 28d of radiation in 2014, now on Anastrazole in remission, presents with acute SOB and hypoxia most likely 2/2 underlying pulmonary fibrosis.

## 2017-09-29 NOTE — DISCHARGE NOTE ADULT - CARE PLAN
Principal Discharge DX:	Upper respiratory infection  Goal:	Resolve symptoms  Instructions for follow-up, activity and diet:	You came to the hospital for cough, runny nose, shortness of breath, and desaturation. You were found to have an upper respiratory virus which explains your symptoms. Please continue drinking fluids and get rest. If you have worsening shortness of breath, cough, runny nose, fever, or chills, please return to the emergency room. Please follow up with your Dr. Back 10/9/2017 11AM [address] and [place] on 7th floor, [phone].  Secondary Diagnosis:	History of breast cancer  Instructions for follow-up, activity and diet:	You have a history of breast cancer and you have been in remission for 3 years. Please continue taking your anastrazole and follow up with Dr. Corrigan.  Secondary Diagnosis:	Hyperlipidemia  Instructions for follow-up, activity and diet:	Please continue taking your lipitor 40mg.

## 2017-10-03 DIAGNOSIS — Z92.3 PERSONAL HISTORY OF IRRADIATION: ICD-10-CM

## 2017-10-03 DIAGNOSIS — R06.2 WHEEZING: ICD-10-CM

## 2017-10-03 DIAGNOSIS — R91.1 SOLITARY PULMONARY NODULE: ICD-10-CM

## 2017-10-03 DIAGNOSIS — J34.89 OTHER SPECIFIED DISORDERS OF NOSE AND NASAL SINUSES: ICD-10-CM

## 2017-10-03 DIAGNOSIS — I10 ESSENTIAL (PRIMARY) HYPERTENSION: ICD-10-CM

## 2017-10-03 DIAGNOSIS — J20.6 ACUTE BRONCHITIS DUE TO RHINOVIRUS: ICD-10-CM

## 2017-10-03 DIAGNOSIS — D72.828 OTHER ELEVATED WHITE BLOOD CELL COUNT: ICD-10-CM

## 2017-10-03 DIAGNOSIS — R73.9 HYPERGLYCEMIA, UNSPECIFIED: ICD-10-CM

## 2017-10-03 DIAGNOSIS — R06.00 DYSPNEA, UNSPECIFIED: ICD-10-CM

## 2017-10-03 DIAGNOSIS — E78.5 HYPERLIPIDEMIA, UNSPECIFIED: ICD-10-CM

## 2017-10-03 DIAGNOSIS — R09.02 HYPOXEMIA: ICD-10-CM

## 2017-10-03 DIAGNOSIS — Z85.3 PERSONAL HISTORY OF MALIGNANT NEOPLASM OF BREAST: ICD-10-CM

## 2017-10-03 DIAGNOSIS — E87.6 HYPOKALEMIA: ICD-10-CM

## 2017-10-03 DIAGNOSIS — Z79.811 LONG TERM (CURRENT) USE OF AROMATASE INHIBITORS: ICD-10-CM

## 2017-10-19 PROCEDURE — 82550 ASSAY OF CK (CPK): CPT

## 2017-10-19 PROCEDURE — 80053 COMPREHEN METABOLIC PANEL: CPT

## 2017-10-19 PROCEDURE — 85027 COMPLETE CBC AUTOMATED: CPT

## 2017-10-19 PROCEDURE — 84484 ASSAY OF TROPONIN QUANT: CPT

## 2017-10-19 PROCEDURE — 71275 CT ANGIOGRAPHY CHEST: CPT

## 2017-10-19 PROCEDURE — 87581 M.PNEUMON DNA AMP PROBE: CPT

## 2017-10-19 PROCEDURE — 87798 DETECT AGENT NOS DNA AMP: CPT

## 2017-10-19 PROCEDURE — 71010: CPT

## 2017-10-19 PROCEDURE — 94150 VITAL CAPACITY TEST: CPT

## 2017-10-19 PROCEDURE — 83880 ASSAY OF NATRIURETIC PEPTIDE: CPT

## 2017-10-19 PROCEDURE — 36415 COLL VENOUS BLD VENIPUNCTURE: CPT

## 2017-10-19 PROCEDURE — 94640 AIRWAY INHALATION TREATMENT: CPT

## 2017-10-19 PROCEDURE — 99285 EMERGENCY DEPT VISIT HI MDM: CPT | Mod: 25

## 2017-10-19 PROCEDURE — 83735 ASSAY OF MAGNESIUM: CPT

## 2017-10-19 PROCEDURE — 87633 RESP VIRUS 12-25 TARGETS: CPT

## 2017-10-19 PROCEDURE — 80048 BASIC METABOLIC PNL TOTAL CA: CPT

## 2017-10-19 PROCEDURE — 93005 ELECTROCARDIOGRAM TRACING: CPT

## 2017-10-19 PROCEDURE — 82553 CREATINE MB FRACTION: CPT

## 2017-10-19 PROCEDURE — 87486 CHLMYD PNEUM DNA AMP PROBE: CPT

## 2017-10-19 PROCEDURE — 83605 ASSAY OF LACTIC ACID: CPT

## 2017-10-19 PROCEDURE — 85379 FIBRIN DEGRADATION QUANT: CPT

## 2017-10-19 PROCEDURE — 96375 TX/PRO/DX INJ NEW DRUG ADDON: CPT | Mod: XU

## 2017-10-19 PROCEDURE — 96374 THER/PROPH/DIAG INJ IV PUSH: CPT | Mod: XU

## 2017-10-19 PROCEDURE — 82803 BLOOD GASES ANY COMBINATION: CPT

## 2022-02-28 NOTE — ED ADULT NURSE NOTE - CAS TRG GENERAL NORM CIRC DET
Increasing lantus insulin to 35 units nightly    Take furosemide Once daily  Take spironolactone once daily  Stop Potassium    Call If significant swelling to the ankles.      You may take claritin for your congestion   Capillary refill less/equal to 2 seconds/Strong peripheral pulses

## 2023-10-17 NOTE — H&P ADULT - NSHPSOURCEINFORD_GEN_ALL_CORE
Detail Level: Detailed
Sunscreen Recommendations: Use a mineral sunscreen daily to sun exposed areas. Reapply every 80 minutes or after sweating or swimming. I counseled the patient regarding the following:
Detail Level: Generalized
Patient